# Patient Record
Sex: FEMALE | Race: BLACK OR AFRICAN AMERICAN | Employment: FULL TIME | URBAN - METROPOLITAN AREA
[De-identification: names, ages, dates, MRNs, and addresses within clinical notes are randomized per-mention and may not be internally consistent; named-entity substitution may affect disease eponyms.]

---

## 2017-11-05 ENCOUNTER — HOSPITAL ENCOUNTER (EMERGENCY)
Age: 52
Discharge: HOME | End: 2017-11-06
Attending: EMERGENCY MEDICINE | Admitting: EMERGENCY MEDICINE
Payer: COMMERCIAL

## 2017-11-05 DIAGNOSIS — M54.50 CHRONIC RIGHT-SIDED LOW BACK PAIN WITHOUT SCIATICA: Primary | ICD-10-CM

## 2017-11-05 DIAGNOSIS — G89.29 CHRONIC RIGHT-SIDED LOW BACK PAIN WITHOUT SCIATICA: Primary | ICD-10-CM

## 2017-11-05 PROCEDURE — 74011250636 HC RX REV CODE- 250/636: Performed by: EMERGENCY MEDICINE

## 2017-11-05 PROCEDURE — 96375 TX/PRO/DX INJ NEW DRUG ADDON: CPT

## 2017-11-05 PROCEDURE — 96374 THER/PROPH/DIAG INJ IV PUSH: CPT

## 2017-11-05 PROCEDURE — 74011250637 HC RX REV CODE- 250/637: Performed by: EMERGENCY MEDICINE

## 2017-11-05 PROCEDURE — 99285 EMERGENCY DEPT VISIT HI MDM: CPT

## 2017-11-05 RX ORDER — KETOROLAC TROMETHAMINE 30 MG/ML
30 INJECTION, SOLUTION INTRAMUSCULAR; INTRAVENOUS
Status: COMPLETED | OUTPATIENT
Start: 2017-11-05 | End: 2017-11-05

## 2017-11-05 RX ORDER — DIAZEPAM 5 MG/1
5 TABLET ORAL
Status: COMPLETED | OUTPATIENT
Start: 2017-11-05 | End: 2017-11-05

## 2017-11-05 RX ORDER — CELECOXIB 200 MG/1
200 CAPSULE ORAL DAILY
COMMUNITY

## 2017-11-05 RX ORDER — MORPHINE SULFATE 10 MG/ML
2 INJECTION, SOLUTION INTRAMUSCULAR; INTRAVENOUS
Status: COMPLETED | OUTPATIENT
Start: 2017-11-05 | End: 2017-11-05

## 2017-11-05 RX ADMIN — KETOROLAC TROMETHAMINE 30 MG: 30 INJECTION, SOLUTION INTRAMUSCULAR at 23:26

## 2017-11-05 RX ADMIN — MORPHINE SULFATE 2 MG: 10 INJECTION INTRAMUSCULAR; INTRAVENOUS; SUBCUTANEOUS at 23:26

## 2017-11-05 RX ADMIN — DIAZEPAM 5 MG: 5 TABLET ORAL at 23:26

## 2017-11-06 VITALS
HEART RATE: 91 BPM | SYSTOLIC BLOOD PRESSURE: 138 MMHG | WEIGHT: 205 LBS | HEIGHT: 66 IN | RESPIRATION RATE: 20 BRPM | TEMPERATURE: 98.5 F | OXYGEN SATURATION: 94 % | BODY MASS INDEX: 32.95 KG/M2 | DIASTOLIC BLOOD PRESSURE: 90 MMHG

## 2017-11-06 PROCEDURE — 96375 TX/PRO/DX INJ NEW DRUG ADDON: CPT

## 2017-11-06 PROCEDURE — 74011250637 HC RX REV CODE- 250/637: Performed by: EMERGENCY MEDICINE

## 2017-11-06 PROCEDURE — 74011250636 HC RX REV CODE- 250/636: Performed by: EMERGENCY MEDICINE

## 2017-11-06 PROCEDURE — 96376 TX/PRO/DX INJ SAME DRUG ADON: CPT

## 2017-11-06 RX ORDER — HYDROMORPHONE HYDROCHLORIDE 1 MG/ML
1 INJECTION, SOLUTION INTRAMUSCULAR; INTRAVENOUS; SUBCUTANEOUS
Status: COMPLETED | OUTPATIENT
Start: 2017-11-06 | End: 2017-11-06

## 2017-11-06 RX ORDER — CYCLOBENZAPRINE HCL 10 MG
10 TABLET ORAL
Status: COMPLETED | OUTPATIENT
Start: 2017-11-06 | End: 2017-11-06

## 2017-11-06 RX ORDER — HYDROMORPHONE HYDROCHLORIDE 2 MG/1
4 TABLET ORAL ONCE
Status: COMPLETED | OUTPATIENT
Start: 2017-11-06 | End: 2017-11-06

## 2017-11-06 RX ORDER — LIDOCAINE 50 MG/G
1 PATCH TOPICAL EVERY 24 HOURS
Status: DISCONTINUED | OUTPATIENT
Start: 2017-11-06 | End: 2017-11-06 | Stop reason: HOSPADM

## 2017-11-06 RX ORDER — HYDROMORPHONE HYDROCHLORIDE 1 MG/ML
1 INJECTION, SOLUTION INTRAMUSCULAR; INTRAVENOUS; SUBCUTANEOUS ONCE
Status: COMPLETED | OUTPATIENT
Start: 2017-11-06 | End: 2017-11-06

## 2017-11-06 RX ORDER — IBUPROFEN 600 MG/1
600 TABLET ORAL
Status: COMPLETED | OUTPATIENT
Start: 2017-11-06 | End: 2017-11-06

## 2017-11-06 RX ORDER — HYDROMORPHONE HYDROCHLORIDE 2 MG/1
2 TABLET ORAL
Qty: 12 TAB | Refills: 0 | Status: SHIPPED | OUTPATIENT
Start: 2017-11-06

## 2017-11-06 RX ADMIN — HYDROMORPHONE HYDROCHLORIDE 1 MG: 1 INJECTION, SOLUTION INTRAMUSCULAR; INTRAVENOUS; SUBCUTANEOUS at 04:31

## 2017-11-06 RX ADMIN — IBUPROFEN 600 MG: 600 TABLET, FILM COATED ORAL at 02:33

## 2017-11-06 RX ADMIN — HYDROMORPHONE HYDROCHLORIDE 4 MG: 2 TABLET ORAL at 02:00

## 2017-11-06 RX ADMIN — CYCLOBENZAPRINE HYDROCHLORIDE 10 MG: 10 TABLET, FILM COATED ORAL at 02:33

## 2017-11-06 RX ADMIN — HYDROMORPHONE HYDROCHLORIDE 1 MG: 1 INJECTION, SOLUTION INTRAMUSCULAR; INTRAVENOUS; SUBCUTANEOUS at 00:14

## 2017-11-06 NOTE — DISCHARGE INSTRUCTIONS

## 2017-11-06 NOTE — ED NOTES
Patient attempts to ambulate but describes stabbing pain to right lower back with movement/wt. Bearing.  assists patient. Gait is unsteady.

## 2017-11-06 NOTE — ED NOTES
Stands up to wheelchair with assist and some pain. Cries out in pain with movement.  supports patient's weight. Taken to bathroom via wheelchair.  assists.

## 2017-11-06 NOTE — ED TRIAGE NOTES
Presents with moderately severe back pain. Denies injury. Reports that she \"stood up off the couch and started hurting\". Hx of laminectomy 2010.

## 2017-11-06 NOTE — ED NOTES
Patient taken to vehicle via wheelchair. Climbs into lifted [place] truck via [place]. No acute distress.

## 2017-11-06 NOTE — ED PROVIDER NOTES
HPI Comments: 11:10 PM  Patricia Motta is a 47 y.o. female with a PMHx of laminectomy (2010) presents to the ED via EMS c/o severe, \"stabbing,\" right sided back pain starting just PTA after she \"stood up off the couch. \" States she has had similar pain in the past but not as severe. States she takes Motrin and Oxycodone with no improvement. Also reports right leg numbness which is chronic. Pt denies bowel/bladder incontinence. No other acute symptoms or complaints were noted. PCP: Goldie Spann MD        The history is provided by the patient. No past medical history on file. No past surgical history on file. No family history on file. Social History     Social History    Marital status:      Spouse name: N/A    Number of children: N/A    Years of education: N/A     Occupational History    Not on file. Social History Main Topics    Smoking status: Never Smoker    Smokeless tobacco: Never Used    Alcohol use No    Drug use: No    Sexual activity: Not on file     Other Topics Concern    Not on file     Social History Narrative    No narrative on file         ALLERGIES: Review of patient's allergies indicates no known allergies. Review of Systems   Constitutional: Negative for chills and fever. HENT: Negative for rhinorrhea. Respiratory: Negative for shortness of breath. Cardiovascular: Negative for chest pain. Gastrointestinal: Negative for abdominal pain, nausea and vomiting. No bowel/bladder incontinence    Endocrine: Negative for polyuria. Genitourinary: Negative for dysuria. Musculoskeletal: Positive for back pain. Skin: Negative for rash. Neurological: Positive for numbness (R leg, is chronic). Negative for headaches.        Vitals:    11/06/17 0100 11/06/17 0115 11/06/17 0130 11/06/17 0145   BP: 150/85 (!) 144/92 140/89 141/80   Pulse:       Resp:       Temp:       SpO2: 94% 94% 94% 94%   Weight:       Height:                Physical Exam   Constitutional: She is oriented to person, place, and time. She appears well-developed and well-nourished. HENT:   Head: Normocephalic and atraumatic. Eyes: Conjunctivae and EOM are normal. Pupils are equal, round, and reactive to light. Neck: Normal range of motion. Neck supple. Cardiovascular: Normal rate and regular rhythm. Palpable DP pulses in all extremities   Pulmonary/Chest: Effort normal and breath sounds normal.   Abdominal: Soft. Bowel sounds are normal. She exhibits no distension. There is no tenderness. There is no rebound. Musculoskeletal: Normal range of motion. Tenderness to right lower lumbar region   Neurological: She is alert and oriented to person, place, and time. She has normal strength. No cranial nerve deficit. Coordination normal. GCS eye subscore is 4. GCS verbal subscore is 5. GCS motor subscore is 6. Normal upper and lower extremity strength and sensation. Skin: Skin is warm and dry. Psychiatric: She has a normal mood and affect. Thought content normal.   Nursing note and vitals reviewed. [facility]  ED Course   Patient with reassuring exam. Able to ambulate and getting into a truck - seen from ED window. Patient has chronic back pain and this is no different she said but occasional gets worse. She said she feels better and is able to be discharged at this time.        Procedures    Vitals:  Patient Vitals for the past 12 hrs:   Temp Pulse Resp BP SpO2   11/06/17 0145 - - - 141/80 94 %   11/06/17 0130 - - - 140/89 94 %   11/06/17 0115 - - - (!) 144/92 94 %   11/06/17 0100 - - - 150/85 94 %   11/06/17 0030 - - - 132/87 94 %   11/06/17 0015 - - - (!) 153/94 94 %   11/06/17 0000 - - - - 96 %   11/05/17 2345 - - - 147/86 96 %   11/05/17 2330 - - - 153/89 95 %   11/05/17 2315 - - - 154/87 95 %   11/05/17 2309 98.5 °F (36.9 °C) 91 20 (!) 138/114 93 %       Medications Ordered:  Medications   lidocaine (LIDODERM) 5 % patch 1 Patch (1 Patch TransDERmal Apply Patch 11/6/17 0431)   diazePAM (VALIUM) tablet 5 mg (5 mg Oral Given 11/5/17 2326)   ketorolac (TORADOL) injection 30 mg (30 mg IntraVENous Given 11/5/17 2326)   morphine injection 2 mg (2 mg IntraVENous Given 11/5/17 2326)   HYDROmorphone (PF) (DILAUDID) injection 1 mg (1 mg IntraVENous Given 11/6/17 0014)   HYDROmorphone (DILAUDID) tablet 4 mg (4 mg Oral Given 11/6/17 0200)   ibuprofen (MOTRIN) tablet 600 mg (600 mg Oral Given 11/6/17 0233)   cyclobenzaprine (FLEXERIL) tablet 10 mg (10 mg Oral Given 11/6/17 0233)   HYDROmorphone (PF) (DILAUDID) injection 1 mg (1 mg IntraVENous Given 11/6/17 0431)       Lab Findings:  No results found for this or any previous visit (from the past 12 hour(s)). Progress notes, consult notes, or additional procedure notes:    Reevaluation of the patient:   4:55 AM Pt feeling better after last dose of Dilaudid and is ready for discharge     Diagnosis:   1. Chronic right-sided low back pain without sciatica        Disposition: Discharged     Follow-up Information     Follow up With Details Comments [id number] [facility] [address] MD ИРИНА Schedule an appointment as soon as possible for a visit  [address] [place] [address]  [phone]             Patient's Medications   Start Taking    HYDROMORPHONE (DILAUDID) 2 MG TABLET    Take 1 Tab by mouth every four (4) hours as needed for Pain. Max Daily Amount: 12 mg. Continue Taking    CELECOXIB (CELEBREX) 200 MG CAPSULE    Take 200 mg by mouth daily. These Medications have changed    No medications on file   Stop Taking    No medications on file     Scribe Attestation:     Michelle Calderon acting as a scribe for and in the presence of Keli Hemphill MD     November 05, 2017 at 11:40 PM       Provider Attestation:     I personally performed the services described in the documentation, reviewed the documentation, as recorded by the scribe in my presence, and it accurately and completely records my words and actions. November 05, 2017 at 11:40 PM - Ender Berman MD

## 2017-11-06 NOTE — ED NOTES
Asked patient if she could ambulate and patient states \"The doctor said I had an hour and its only been 26 minutes. \"